# Patient Record
Sex: FEMALE | Race: WHITE
[De-identification: names, ages, dates, MRNs, and addresses within clinical notes are randomized per-mention and may not be internally consistent; named-entity substitution may affect disease eponyms.]

---

## 2018-09-12 ENCOUNTER — HOSPITAL ENCOUNTER (EMERGENCY)
Dept: HOSPITAL 1 - ED | Age: 28
Discharge: HOME | End: 2018-09-12
Payer: COMMERCIAL

## 2018-09-12 VITALS — HEIGHT: 62 IN | BODY MASS INDEX: 22.08 KG/M2 | WEIGHT: 120 LBS

## 2018-09-12 VITALS — SYSTOLIC BLOOD PRESSURE: 114 MMHG | DIASTOLIC BLOOD PRESSURE: 73 MMHG

## 2018-09-12 DIAGNOSIS — Z88.8: ICD-10-CM

## 2018-09-12 DIAGNOSIS — G43.909: ICD-10-CM

## 2018-09-12 DIAGNOSIS — M62.830: Primary | ICD-10-CM

## 2019-11-10 ENCOUNTER — HOSPITAL ENCOUNTER (EMERGENCY)
Dept: HOSPITAL 26 - MED | Age: 29
Discharge: HOME | End: 2019-11-10
Payer: COMMERCIAL

## 2019-11-10 VITALS — HEIGHT: 62 IN | WEIGHT: 120 LBS | BODY MASS INDEX: 22.08 KG/M2

## 2019-11-10 VITALS — DIASTOLIC BLOOD PRESSURE: 58 MMHG | SYSTOLIC BLOOD PRESSURE: 106 MMHG

## 2019-11-10 VITALS — SYSTOLIC BLOOD PRESSURE: 104 MMHG | DIASTOLIC BLOOD PRESSURE: 57 MMHG

## 2019-11-10 DIAGNOSIS — Z88.8: ICD-10-CM

## 2019-11-10 DIAGNOSIS — Z3A.22: ICD-10-CM

## 2019-11-10 DIAGNOSIS — O26.892: Primary | ICD-10-CM

## 2019-11-10 DIAGNOSIS — R55: ICD-10-CM

## 2019-11-10 NOTE — NUR
PT BIBA TO ED C/O SYNCOPE EPISODE X 40MINS AGO. PT DENIES ANY HEAD INJURY WHEN 
FAINTING. NO RESP DISTRESS NOTED. LUNG SOUNDS CLEAR ALL THROUGHOUT. A &O X 4. 
NO PAIN JUST HA. 2 PERRLA. HEART SOUNDS S1S2 PRESENT. . 22 WEEKS PREG. PT 
STATES THIS HAPPEN TO HER WHEN SHE WAS PREG THE LAST TIME WITH HER 1ST 
PREGNACY. VSS. NO DISTRESS NOTED.



NO PMH.

## 2019-11-10 NOTE — NUR
-------------------------------------------------------------------------------

            *** Note mitzy in EDM - 11/10/19 at 1449 by [facility] ***             

-------------------------------------------------------------------------------

PT MOTHER SAID SHE FOUND HER ON THE FLOOR. PT MOTHER SAYS SHE HIT HER HEAD. NO 
OBVIOUS DEFOMRITY NOTED OR HEMATOMAS NOTED ON THE HEAD.